# Patient Record
(demographics unavailable — no encounter records)

---

## 2024-11-05 NOTE — ASSESSMENT
[FreeTextEntry1] : Fracture right clavicle  The patient will resume full activity in approximately 1 week.  She will return to this office on a as needed basis.

## 2024-11-05 NOTE — PHYSICAL EXAM
[FreeTextEntry1] : On physical examination there is no tenderness or swelling in the region of the right clavicle.  Patient has a full range of motion of the right shoulder with normal muscle strength

## 2024-11-05 NOTE — DATA REVIEWED
[de-identified] : X-ray evaluation of the right clavicle on 11/5/2024 (AP and oblique views) reveals a healed clavicle shaft fracture

## 2025-02-05 NOTE — ASSESSMENT
[FreeTextEntry1] : Rule out Salter I fracture right distal radius  The patient will be treated with a wrist splint and she will return in 2 weeks for x-ray reevaluation.

## 2025-02-05 NOTE — DATA REVIEWED
[de-identified] : Review of x-rays of the right wrist from [facility] on 2/2/2025 (AP, lateral and oblique views) reveals no obvious abnormalities.

## 2025-02-05 NOTE — HISTORY OF PRESENT ILLNESS
[FreeTextEntry1] : This 14-year-old female is here for evaluation of an injury sustained to the right wrist 2 days ago while playing hockey.  The patient was seen at the [facility] emergency room where x-rays were read as a possible Salter fracture.  A sugar-tong splint was applied and the patient was sent to this office for pediatric orthopedic consultation.

## 2025-02-05 NOTE — HISTORY OF PRESENT ILLNESS
[FreeTextEntry1] : This 14-year-old female is here for evaluation of an injury sustained to the right wrist 2 days ago while playing hockey.  The patient was seen at the [facility] emergency room where x-rays were read as a possible Salter fracture.  A sugar-tong splint was applied and the patient was sent to this office for pediatric orthopedic consultation.

## 2025-02-05 NOTE — DATA REVIEWED
[de-identified] : Review of x-rays of the right wrist from [facility] on 2/2/2025 (AP, lateral and oblique views) reveals no obvious abnormalities.

## 2025-02-05 NOTE — CONSULT LETTER
[Dear  ___] : Dear  [unfilled], [Consult Letter:] : I had the pleasure of evaluating your patient, [unfilled]. [Please see my note below.] : Please see my note below. [Consult Closing:] : Thank you very much for allowing me to participate in the care of this patient.  If you have any questions, please do not hesitate to contact me. [Sincerely,] : Sincerely, [FreeTextEntry3] : Dr Owens

## 2025-02-05 NOTE — PHYSICAL EXAM
[FreeTextEntry1] : The sugar-tong splint has been removed.  The patient has swelling and tenderness in the region of the epiphyseal plate of the right distal radius.  There is no obvious deformity.  The neurovascular exam of the right upper extremity is intact.  There is a full range of motion of the elbow without tenderness.

## 2025-02-23 NOTE — DATA REVIEWED
[de-identified] : X-ray evaluation of the right wrist on 2/18/2025 (AP, lateral and oblique views reveals a healed fracture of the distal radius.  There is no deformity.

## 2025-02-23 NOTE — DATA REVIEWED
[de-identified] : X-ray evaluation of the right wrist on 2/18/2025 (AP, lateral and oblique views reveals a healed fracture of the distal radius.  There is no deformity.

## 2025-02-23 NOTE — PHYSICAL EXAM
[FreeTextEntry1] : On physical examination with the splint removed the patient has minimal swelling and no tenderness at the fracture site.  There is no deformity.  The neurovascular exam of the right upper extremity is intact.

## 2025-02-23 NOTE — HISTORY OF PRESENT ILLNESS
[FreeTextEntry1] : This 14-year-old female returns for reevaluation of fracture of the right distal radius.  The patient has been using a splint and at this time she has no significant symptomatology.  She has no neurovascular complaints.

## 2025-02-23 NOTE — ASSESSMENT
[FreeTextEntry1] : Fracture right distal radius  The patient will continue using the splint.  She will return in 2 weeks for reevaluation with x-ray.

## 2025-03-04 NOTE — PHYSICAL EXAM
[FreeTextEntry1] : On physical examination there is a full range of motion of the right wrist.  There is no deformity.

## 2025-03-04 NOTE — HISTORY OF PRESENT ILLNESS
[FreeTextEntry1] : This 14-year-old female returns for reevaluation of a Salter I fracture of the right distal radius.  She has no complaints at this time.  The splint has been discontinued.

## 2025-03-04 NOTE — DATA REVIEWED
[de-identified] : X-ray evaluation of the right wrist on 3/4/2025 (AP, lateral and oblique views) reveals a healed distal radial fracture without deformity

## 2025-03-04 NOTE — ASSESSMENT
[FreeTextEntry1] : Healed distal radial fracture right wrist  The patient will return to full activity.  She will return on a as needed basis.